# Patient Record
Sex: FEMALE | NOT HISPANIC OR LATINO | URBAN - METROPOLITAN AREA
[De-identification: names, ages, dates, MRNs, and addresses within clinical notes are randomized per-mention and may not be internally consistent; named-entity substitution may affect disease eponyms.]

---

## 2023-01-11 ENCOUNTER — HOSPITAL ENCOUNTER (EMERGENCY)
Facility: OTHER | Age: 53
Discharge: HOME | End: 2023-01-11
Attending: EMERGENCY MEDICINE
Payer: MEDICAID

## 2023-01-11 VITALS
OXYGEN SATURATION: 98 % | HEIGHT: 64 IN | WEIGHT: 150 LBS | RESPIRATION RATE: 20 BRPM | SYSTOLIC BLOOD PRESSURE: 176 MMHG | DIASTOLIC BLOOD PRESSURE: 103 MMHG | BODY MASS INDEX: 25.61 KG/M2 | TEMPERATURE: 98 F | HEART RATE: 83 BPM

## 2023-01-11 DIAGNOSIS — Z91.148 NONCOMPLIANCE WITH MEDICATIONS: ICD-10-CM

## 2023-01-11 DIAGNOSIS — I10 PRIMARY HYPERTENSION: ICD-10-CM

## 2023-01-11 DIAGNOSIS — S40.022A CONTUSION OF LEFT UPPER EXTREMITY, INITIAL ENCOUNTER: Primary | ICD-10-CM

## 2023-01-11 DIAGNOSIS — S46.912A MUSCLE STRAIN OF LEFT UPPER ARM, INITIAL ENCOUNTER: ICD-10-CM

## 2023-01-11 DIAGNOSIS — M79.603 ARM PAIN: ICD-10-CM

## 2023-01-11 PROCEDURE — 96372 THER/PROPH/DIAG INJ SC/IM: CPT | Performed by: NURSE PRACTITIONER

## 2023-01-11 PROCEDURE — 63600175 PHARM REV CODE 636 W HCPCS: Performed by: NURSE PRACTITIONER

## 2023-01-11 PROCEDURE — 99284 EMERGENCY DEPT VISIT MOD MDM: CPT

## 2023-01-11 RX ORDER — NAPROXEN 375 MG/1
375 TABLET ORAL 2 TIMES DAILY WITH MEALS
Qty: 60 TABLET | Refills: 0 | Status: SHIPPED | OUTPATIENT
Start: 2023-01-11

## 2023-01-11 RX ORDER — KETOROLAC TROMETHAMINE 30 MG/ML
30 INJECTION, SOLUTION INTRAMUSCULAR; INTRAVENOUS
Status: COMPLETED | OUTPATIENT
Start: 2023-01-11 | End: 2023-01-11

## 2023-01-11 RX ORDER — AMLODIPINE BESYLATE 5 MG/1
5 TABLET ORAL DAILY
Qty: 30 TABLET | Refills: 0 | Status: SHIPPED | OUTPATIENT
Start: 2023-01-11 | End: 2023-02-10

## 2023-01-11 RX ADMIN — KETOROLAC TROMETHAMINE 30 MG: 30 INJECTION, SOLUTION INTRAMUSCULAR; INTRAVENOUS at 12:01

## 2023-01-11 NOTE — ED TRIAGE NOTES
Pt states her arm was stuck in a sofa and she had a difficult time getting it out. Pt states her arm is hurting, up into her back. Pt with various complaints. PT is alert and oriented, ambulatory, respirations are even unlabored. PT in NAD

## 2023-01-11 NOTE — ED PROVIDER NOTES
"Source of History:  Patient     Chief complaint:  Arm Injury (Left upper arm. 1 week ago arm stuck in sofa. Using ice and ibuprofen cont with pain. Purple bruis noted. CMS intact to left upper ext.)      HPI:  Carlene Mcguire is a 52 y.o. female presenting to the emergency department with complaint of left upper arm pain cover that began 1 week ago while she was playing with her grandson.  States that she got it stuck and a sofa.  Reports bruising, swelling to the biceps area with no relief with over-the-counter medications.  She reports pain to her shoulder with movement.    This is the extent to the patients complaints today here in the emergency department.    PMH:  As per HPI and below:  History reviewed. No pertinent past medical history.  History reviewed. No pertinent surgical history.    Social History     Tobacco Use    Smoking status: Every Day     Types: Cigarettes    Smokeless tobacco: Never   Substance Use Topics    Alcohol use: Not Currently    Drug use: Never       Review of patient's allergies indicates:  No Known Allergies    ROS: As per HPI and below:  General: No fever.  No chills.  Eyes: No visual changes.   ENT: No sore throat. No ear pain.  Urinary: No abnormal urination.  MSK:  Arm pain  Integument:  Bruising. No rashes or lesions.       Physical Exam:    BP (!) 176/103 (BP Location: Left arm, Patient Position: Sitting) Comment: Has not taken BP meds in months, provider aware  Pulse 83   Temp 98 °F (36.7 °C) (Oral)   Resp 20   Ht 5' 4" (1.626 m)   Wt 68 kg (150 lb)   SpO2 98%   BMI 25.75 kg/m²   Vitals:    01/11/23 0958 01/11/23 1341   BP: (!) 146/113 (!) 176/103   Pulse: 87 83   Resp: 20 20   Temp: 98.8 °F (37.1 °C) 98 °F (36.7 °C)   TempSrc: Oral Oral   SpO2: 99% 98%   Weight: 68 kg (150 lb)    Height: 5' 4" (1.626 m)        Nursing note and vital signs reviewed.  Appearance: No acute distress.  Eyes: No conjunctival injection.  Extraocular muscles are intact.  ENT: Normal " phonation..  Neck:  Full range motion of the neck is noted.  No midline tenderness or step-offs.  Cardio:  Radial pulse +2 bilaterally.  Musculoskeletal:  Full range of motion of the elbow and shoulders noted.  No Russell sign to the left arm.  Skin:  hematoma noted over the left biceps area.  No rashes seen.  Good turgor.  No abrasions.  No ecchymoses.  Neuro:   is equal bilaterally.  Upper strength 5/5 bilaterally.  Mental Status:  Alert and oriented x 3.  Appropriate, conversant.    Initial MDM:  52-year-old female with left arm pain and bruising after playing with her grandson when week ago.  No relief with over-the-counter medications.    Labs Reviewed - No data to display    X-Ray Humerus 2 View Left   Final Result      As above.         Electronically signed by: Dominic Jacobs   Date:    01/11/2023   Time:    13:31            Initial Impression/ Differential Dx:  Differential Diagnosis includes, but is not limited to:  Fracture, dislocation, compartment syndrome, nerve injury/palsy, vascular injury, rhabdomyolysis, hemarthrosis, septic joint, bursitis, muscle strain, ligament tear/sprain, abrasion, soft tissue contusion, osteoarthritis.      MDM:    52 y.o. female with left arm pain after playing with her grandson 1 week ago.  On exam bruising noted to the left biceps area.  Full range of motion the elbow and shoulder.  No Russell sign, low concern for biceps tendon rupture.  Strength upper extremities 5/5 bilaterally.  Likely hematoma.  Discussed anti-inflammatories and ice for the patient.  Also noted to be hypertensive in the emergency department.  Patient reports she is not taken her blood pressure medication for multiple years and unsure what medicine she is on.  Will discharge patient home with low-dose amlodipine and discussed needs close follow-up with primary care.  Internal Medicine referral was placed for the patient.         Diagnostic Impression:    1. Contusion of left upper extremity, initial  encounter    2. Arm pain    3. Muscle strain of left upper arm, initial encounter    4. Noncompliance with medications    5. Primary hypertension         ED Disposition Condition    Discharge Stable            ED Prescriptions       Medication Sig Dispense Start Date End Date Auth. Provider    naproxen (NAPROSYN) 375 MG tablet Take 1 tablet (375 mg total) by mouth 2 (two) times daily with meals. 60 tablet 1/11/2023 -- MALIK Lerma    amLODIPine (NORVASC) 5 MG tablet Take 1 tablet (5 mg total) by mouth once daily. 30 tablet 1/11/2023 2/10/2023 MALIK Lerma          Follow-up Information       Follow up With Specialties Details Why Contact Info    [facility] - Emergency Dept Emergency Medicine Go to  If symptoms worsen [address]  [facility] [id number] [phone]               MALIK Lerma  01/11/23 5630

## 2023-01-11 NOTE — FIRST PROVIDER EVALUATION
Emergency Department TeleTriage Encounter Note      CHIEF COMPLAINT    Chief Complaint   Patient presents with    Arm Injury     Left upper arm. 1 week ago arm stuck in sofa. Using ice and ibuprofen cont with pain. Purple bruis noted. CMS intact to left upper ext.       VITAL SIGNS   Initial Vitals [01/11/23 0958]   BP Pulse Resp Temp SpO2   (!) 146/113 87 20 98.8 °F (37.1 °C) 99 %      MAP       --            ALLERGIES    Review of patient's allergies indicates:  No Known Allergies    PROVIDER TRIAGE NOTE  This is a teletriage evaluation of a 52 y.o. female presenting to the ED complaining of arm injury. Patient states her left arm was stuck in a sofa one week ago. She has continued pain and bruising to the upper arm. She also reports blisters and pain to her mouth.    Patient is alert and oriented. She speaks in complete sentences. She is sitting upright in the chair in no distress. Bruising to the left arm just proximal to the elbow noted.    Initial orders will be placed and care will be transferred to an alternate provider when patient is roomed for a full evaluation. Any additional orders and the final disposition will be determined by that provider.         ORDERS  Labs Reviewed - No data to display    ED Orders (720h ago, onward)      Start Ordered     Status Ordering Provider    01/11/23 1108 01/11/23 1107  X-Ray Humerus 2 View Left  1 time imaging         Ordered PEPE YU              Virtual Visit Note: The provider triage portion of this emergency department evaluation and documentation was performed via [employer], a HIPAA-compliant telemedicine application, in concert with a tele-presenter in the room. A face to face patient evaluation with one of my colleagues will occur once the patient is placed in an emergency department room.      DISCLAIMER: This note was prepared with [employer] voice recognition transcription software. Garbled syntax, mangled pronouns, and other bizarre constructions may  be attributed to that software system.

## 2023-01-11 NOTE — DISCHARGE INSTRUCTIONS
Take your blood pressure medications daily.  Someone will call to set up an appointment with a primary care providers.